# Patient Record
Sex: MALE | Race: WHITE
[De-identification: names, ages, dates, MRNs, and addresses within clinical notes are randomized per-mention and may not be internally consistent; named-entity substitution may affect disease eponyms.]

---

## 2018-10-05 ENCOUNTER — HOSPITAL ENCOUNTER (EMERGENCY)
Dept: HOSPITAL 96 - M.ERS | Age: 53
Discharge: HOME | End: 2018-10-05
Payer: COMMERCIAL

## 2018-10-05 VITALS — DIASTOLIC BLOOD PRESSURE: 88 MMHG | SYSTOLIC BLOOD PRESSURE: 155 MMHG

## 2018-10-05 VITALS — WEIGHT: 180.01 LBS | HEIGHT: 74 IN | BODY MASS INDEX: 23.1 KG/M2

## 2018-10-05 DIAGNOSIS — Z88.1: ICD-10-CM

## 2018-10-05 DIAGNOSIS — Z88.0: ICD-10-CM

## 2018-10-05 DIAGNOSIS — T78.3XXA: Primary | ICD-10-CM

## 2020-05-18 ENCOUNTER — HOSPITAL ENCOUNTER (OUTPATIENT)
Dept: HOSPITAL 96 - M.CT | Age: 55
End: 2020-05-18
Attending: INTERNAL MEDICINE
Payer: COMMERCIAL
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
## 2020-05-18 DIAGNOSIS — S00.03XA: Primary | ICD-10-CM

## 2020-05-18 DIAGNOSIS — G31.9: ICD-10-CM

## 2020-05-20 ENCOUNTER — HOSPITAL ENCOUNTER (INPATIENT)
Dept: HOSPITAL 96 - M.ERS | Age: 55
LOS: 2 days | Discharge: HOME | DRG: 641 | End: 2020-05-22
Attending: INTERNAL MEDICINE | Admitting: INTERNAL MEDICINE
Payer: COMMERCIAL

## 2020-05-20 VITALS — BODY MASS INDEX: 21.84 KG/M2 | HEIGHT: 72.99 IN | WEIGHT: 164.8 LBS

## 2020-05-20 VITALS — DIASTOLIC BLOOD PRESSURE: 83 MMHG | SYSTOLIC BLOOD PRESSURE: 139 MMHG

## 2020-05-20 VITALS — DIASTOLIC BLOOD PRESSURE: 77 MMHG | SYSTOLIC BLOOD PRESSURE: 115 MMHG

## 2020-05-20 VITALS — SYSTOLIC BLOOD PRESSURE: 113 MMHG | DIASTOLIC BLOOD PRESSURE: 76 MMHG

## 2020-05-20 VITALS — DIASTOLIC BLOOD PRESSURE: 75 MMHG | SYSTOLIC BLOOD PRESSURE: 116 MMHG
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
## 2020-05-20 DIAGNOSIS — D61.818: ICD-10-CM

## 2020-05-20 DIAGNOSIS — Z79.899: ICD-10-CM
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
## 2020-05-20 DIAGNOSIS — S05.11XA: ICD-10-CM

## 2020-05-20 DIAGNOSIS — F10.10: ICD-10-CM

## 2020-05-20 DIAGNOSIS — E87.6: Primary | ICD-10-CM

## 2020-05-20 DIAGNOSIS — E44.1: ICD-10-CM
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
## 2020-05-20 DIAGNOSIS — S05.12XA: ICD-10-CM

## 2020-05-20 DIAGNOSIS — Z88.8: ICD-10-CM

## 2020-05-20 DIAGNOSIS — Z91.048: ICD-10-CM

## 2020-05-20 DIAGNOSIS — K70.9: ICD-10-CM

## 2020-05-20 DIAGNOSIS — I49.9: ICD-10-CM
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
## 2020-05-20 DIAGNOSIS — R55: ICD-10-CM
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
## 2020-05-20 LAB
ABSOLUTE BASOPHILS: 0 THOU/UL (ref 0–0.2)
ABSOLUTE EOSINOPHILS: 0.2 THOU/UL (ref 0–0.7)
ABSOLUTE MONOCYTES: 0.6 THOU/UL (ref 0–1.2)
ALBUMIN SERPL-MCNC: 4.7 G/DL (ref 3.4–5)
ALP SERPL-CCNC: 84 U/L (ref 46–116)
ALT SERPL-CCNC: 175 U/L (ref 30–65)
ANION GAP SERPL CALC-SCNC: 9 MMOL/L (ref 7–16)
APTT BLD: 24 SECONDS (ref 25–31.3)
AST SERPL-CCNC: 261 U/L (ref 15–37)
BASOPHILS NFR BLD AUTO: 1.2 %
BILIRUB SERPL-MCNC: 1.2 MG/DL
BUN SERPL-MCNC: 7 MG/DL (ref 7–18)
CALCIUM SERPL-MCNC: 8.7 MG/DL (ref 8.5–10.1)
CHLORIDE SERPL-SCNC: 88 MMOL/L (ref 98–107)
CO2 SERPL-SCNC: 36 MMOL/L (ref 21–32)
CREAT SERPL-MCNC: 0.9 MG/DL (ref 0.6–1.3)
EOSINOPHIL NFR BLD: 4.2 %
GLUCOSE SERPL-MCNC: 88 MG/DL (ref 70–99)
GRANULOCYTES NFR BLD MANUAL: 53 %
HCT VFR BLD CALC: 35.7 % (ref 42–52)
HGB BLD-MCNC: 13 GM/DL (ref 14–18)
INR PPP: 1
LYMPHOCYTES # BLD: 0.9 THOU/UL (ref 0.8–5.3)
LYMPHOCYTES NFR BLD AUTO: 25.4 %
MAGNESIUM SERPL-MCNC: 2.1 MG/DL (ref 1.8–2.4)
MCH RBC QN AUTO: 36.7 PG (ref 26–34)
MCHC RBC AUTO-ENTMCNC: 36.4 G/DL (ref 28–37)
MCV RBC: 101 FL (ref 80–100)
MONOCYTES NFR BLD: 16.2 %
MPV: 7.4 FL. (ref 7.2–11.1)
NEUTROPHILS # BLD: 1.9 THOU/UL (ref 1.6–8.1)
NUCLEATED RBCS: 0 /100WBC
PLATELET COUNT*: 142 THOU/UL (ref 150–400)
POTASSIUM SERPL-SCNC: 1.9 MMOL/L (ref 3.5–5.1)
PROT SERPL-MCNC: 7.9 G/DL (ref 6.4–8.2)
PROTHROMBIN TIME: 10.5 SECONDS (ref 9.2–11.5)
RBC # BLD AUTO: 3.53 MIL/UL (ref 4.5–6)
RDW-CV: 14 % (ref 10.5–14.5)
SODIUM SERPL-SCNC: 133 MMOL/L (ref 136–145)
WBC # BLD AUTO: 3.6 THOU/UL (ref 4–11)

## 2020-05-21 VITALS — SYSTOLIC BLOOD PRESSURE: 124 MMHG | DIASTOLIC BLOOD PRESSURE: 75 MMHG

## 2020-05-21 VITALS — DIASTOLIC BLOOD PRESSURE: 73 MMHG | SYSTOLIC BLOOD PRESSURE: 118 MMHG

## 2020-05-21 VITALS — SYSTOLIC BLOOD PRESSURE: 123 MMHG | DIASTOLIC BLOOD PRESSURE: 78 MMHG

## 2020-05-21 VITALS — DIASTOLIC BLOOD PRESSURE: 64 MMHG | SYSTOLIC BLOOD PRESSURE: 93 MMHG

## 2020-05-21 VITALS — DIASTOLIC BLOOD PRESSURE: 90 MMHG | SYSTOLIC BLOOD PRESSURE: 123 MMHG

## 2020-05-21 VITALS — SYSTOLIC BLOOD PRESSURE: 113 MMHG | DIASTOLIC BLOOD PRESSURE: 70 MMHG

## 2020-05-21 LAB
ALBUMIN SERPL-MCNC: 3.9 G/DL (ref 3.4–5)
ALP SERPL-CCNC: 73 U/L (ref 46–116)
ALT SERPL-CCNC: 132 U/L (ref 30–65)
ANION GAP SERPL CALC-SCNC: 7 MMOL/L (ref 7–16)
AST SERPL-CCNC: 162 U/L (ref 15–37)
BILIRUB SERPL-MCNC: 1.1 MG/DL
BILIRUB UR-MCNC: NEGATIVE MG/DL
BUN SERPL-MCNC: 7 MG/DL (ref 7–18)
CALCIUM SERPL-MCNC: 8 MG/DL (ref 8.5–10.1)
CHLORIDE SERPL-SCNC: 89 MMOL/L (ref 98–107)
CO2 SERPL-SCNC: 35 MMOL/L (ref 21–32)
COLOR UR: YELLOW
CREAT SERPL-MCNC: 0.7 MG/DL (ref 0.6–1.3)
GLUCOSE SERPL-MCNC: 77 MG/DL (ref 70–99)
HCT VFR BLD CALC: 31.3 % (ref 42–52)
HGB BLD-MCNC: 11.3 GM/DL (ref 14–18)
KETONES UR STRIP-MCNC: (no result) MG/DL
MAGNESIUM SERPL-MCNC: 2 MG/DL (ref 1.8–2.4)
MCH RBC QN AUTO: 36.5 PG (ref 26–34)
MCHC RBC AUTO-ENTMCNC: 36.1 G/DL (ref 28–37)
MCV RBC: 101.1 FL (ref 80–100)
MPV: 7.6 FL. (ref 7.2–11.1)
NITRITE UR QL STRIP: NEGATIVE
PLATELET COUNT*: 140 THOU/UL (ref 150–400)
POTASSIUM SERPL-SCNC: 2 MMOL/L (ref 3.5–5.1)
PROT SERPL-MCNC: 6.8 G/DL (ref 6.4–8.2)
PROT UR QL STRIP: NEGATIVE
RBC # BLD AUTO: 3.1 MIL/UL (ref 4.5–6)
RBC # UR STRIP: NEGATIVE /UL
RDW-CV: 14 % (ref 10.5–14.5)
SODIUM SERPL-SCNC: 131 MMOL/L (ref 136–145)
SP GR UR STRIP: 1.01 (ref 1–1.03)
URINE CHLORIDE-RANDOM*: 87 MMOL/L
URINE CLARITY: CLEAR
URINE GLUCOSE-RANDOM: NEGATIVE
URINE LEUKOCYTES: NEGATIVE
URINE POTASSIUM-RANDOM: 6.9 MMOL/L
UROBILINOGEN UR STRIP-ACNC: 1 E.U./DL (ref 0.2–1)
WBC # BLD AUTO: 2.9 THOU/UL (ref 4–11)

## 2020-05-21 NOTE — EKG
[place] [address]
Phone:  [phone]                     ELECTROCARDIOGRAM REPORT      
_______________________________________________________________________________
 
Name:         EDMUND BURDEN              Room:          [address]    ADM IN 
M.R.#:    [id number]     Account #:     [id number]  
Admission:    20    Attend Phys:   Edmund Pineda, 
Discharge:                Date of Birth: [birth year]  
Date of Service: 20 1506  Report #:      [id number]
        [id number]
_______________________________________________________________________________
THIS REPORT FOR:  //name//                      
 
                         [facility] ED
                                       
Test Date:    2020               Test Time:    15:06:37
Pat Name:     EDMUND BURDEN           Department:   
Patient ID:   [id number]            Room:         [facility]
Gender:       M                        Technician:   jolynn
:          1965               Requested By: Ash Stauffer
Order Number: [id number] MD:   Cornelius Mcdonald
                                 Measurements
Intervals                              Axis          
Rate:         87                       P:            86
OK:           138                      QRS:          29
QRSD:         123                      T:            3
QT:           507                                    
QTc:          610                                    
                           Interpretive Statements
Sinus rhythm
Nonspecific intraventricular conduction delay
Nonspecific repol abnormality, diffuse leads, consider ischemia
No previous ECG available for comparison
 
Electronically Signed On 2020 8:05:13 CDT by Cornelius Mcdonald
[email or web address]
 
 
 
 
 
 
 
 
 
 
 
 
 
 
 
 
 
 
 
  <ELECTRONICALLY SIGNED>
                                           By: Cornelius Mcdonald MD, FACC   
  20     0805
D: 20 1506   _____________________________________
T: 20 1506   Cornelius Mcdonald MD, [facility]     /EPI

## 2020-05-22 VITALS — SYSTOLIC BLOOD PRESSURE: 132 MMHG | DIASTOLIC BLOOD PRESSURE: 87 MMHG

## 2020-05-22 VITALS — DIASTOLIC BLOOD PRESSURE: 88 MMHG | SYSTOLIC BLOOD PRESSURE: 137 MMHG

## 2020-05-22 VITALS — DIASTOLIC BLOOD PRESSURE: 78 MMHG | SYSTOLIC BLOOD PRESSURE: 151 MMHG

## 2020-05-22 LAB
ANION GAP SERPL CALC-SCNC: 7 MMOL/L (ref 7–16)
BUN SERPL-MCNC: 6 MG/DL (ref 7–18)
CALCIUM SERPL-MCNC: 8.6 MG/DL (ref 8.5–10.1)
CHLORIDE SERPL-SCNC: 91 MMOL/L (ref 98–107)
CO2 SERPL-SCNC: 34 MMOL/L (ref 21–32)
CREAT SERPL-MCNC: 0.8 MG/DL (ref 0.6–1.3)
GLUCOSE SERPL-MCNC: 102 MG/DL (ref 70–99)
MAGNESIUM SERPL-MCNC: 1.9 MG/DL (ref 1.8–2.4)
POTASSIUM SERPL-SCNC: 3.4 MMOL/L (ref 3.5–5.1)
SODIUM SERPL-SCNC: 132 MMOL/L (ref 136–145)